# Patient Record
(demographics unavailable — no encounter records)

---

## 2018-01-01 NOTE — DS
- Maternal History


Mother's Age: 27yo


 Status: 


HBSAG: Unknown


RPR: Negative


Date: 18


Group B Strep: Unknown


GBS Treated in Labor: Yes


HIV: Negative





- Maternal Risks


OB Risks: e.t.o.p. 3/16; h/o kidney stone treated with antibiotics 2 yrs ago.  

patient drop in from [facility], no PN care; GBS unknown, 

treated with amp x3 (ROM 1H 55M).  Admitted to nursery at 2208





 Data





- Admission


Date of Admission: 18


Admission Time: 22:08


Date of Delivery: 18


Time of Delivery: 21:07


Wks Gestation by Dates: 38.4


Wks Gestation by Sono: 37.6


Infant Gender: Female


Type of Delivery: 


Apgar Score @1 Minute: 9


Apgar score @ 5 Minutes: 9


Birth Weight: 5 lb 7.797 oz


Birth Length: 19 in


Head Circumference, Admission: 32


Chest Circumference: 31


Abdominal Girth: 30





- Vital Signs


  ** Left Upper Arm


Blood Pressure: 64/36


Blood Pressure Mean: 45





  ** Right Upper Arm


Blood Pressure: 65/34


Blood Pressure Mean: 44





  ** Left Calf


Blood Pressure: 55/36


Blood Pressure Mean: 42





  ** Right Calf


Blood Pressure: 60/39


Blood Pressure Mean: 46





- Hearing Screen


Left Ear: Passed


Right Ear: Passed


Hearing Screen Complete: 18





- Labs


Labs: 


 Baby's Blood Type, Syed











Cord Blood Type  B POSITIVE   18  21:20    


 


SABINE, Poly Interpret  Negative  (NEGATIVE)   18  21:20    














- Hepatitis B Vaccine Given


Date: 





Medications








Hepatitis B Vaccine (Engerix-B 10 Mcg/0.5 Ml *Pediatric* -)  10 mcg IM .ONCE ONE


   Stop: 18 23:01











 PE, Discharge





- Physical Exam


Last Weight Documented: 5 lb 5.01 oz


Vital Signs: 


 Vital Signs











Temperature  98.4 F   18 21:00


 


Pulse Rate  142   18 22:08


 


Respiratory Rate  35   18 22:08


 


Blood Pressure  64/36   18 10:01


 


O2 Sat by Pulse Oximetry (%)      








 SpO2





Preductal SpO2, Right Arm        100


Postductal SpO2 [Left Leg]       99








General Appearance: Yes: Well flexed, Full ROM, Spontaneous movements, Pink


Skin: Yes: No Abnormalities


Head: Yes: Fontanel flat


Eyes: Yes: Clear


Ears: Yes: Symmetrical


Nose: Yes: Nares patent


Mouth: No: Cleft lip, Cleft palate


Chest: Yes: Symmetrical


Lungs/Respiratory: Yes: Bilateral good air entry.  No: Sternal retractions, 

Substernal retractions


Cardiac: Yes: S1, S2, Peripheral pulses strong, Capillary refill immediat.  No: 

Murmur


Abdomen: No: Mass palpable


Gastrointestinal: No: Hepatomegaly, Splenomegaly


Genitalia: No Abnormalities


Genitalia, Female: Yes: Labia Normal


Anus: Yes: Patent


Extremities: Yes: No Abnormalities, 10 Fingers, 10 Toes


Spine: No: Sacral dimple, Hair tuft


Reflexes: [place]: Present, Rooting: Present, Sucking: Present


Neuro: Yes: Alert, Active


Cry: Yes: Strong


Preductal SpO2, Right Arm: 100


  ** Left Leg


Postductal SpO2: 99





Problem List





- Problems


(1) Single liveborn infant, delivered vaginally


Assessment/Plan: 


AGA FEMALE BORN TO 27YO  MOTHER WHO WAS A DROP-IN TO [facility]. RPR AND HIV ARE 

NEGATIVE. GBS IS UNKNOWN  AND MOTHER WAS TREATED  X 3 ,ROM 1HR AND 55 MIN


P: ROUTINE CARE


 FEED AD ABHIJEET


DISCHARGE HOME


Code(s): Z38.00 - SINGLE LIVEBORN INFANT, DELIVERED VAGINALLY   





Discharge Summary


Reason For Visit: 


Current Active Problems





Single liveborn infant, delivered vaginally (Acute)








Condition: Good





- Instructions


Diet, Activity, Other Instructions: 


f/u    2018


@ : [facility] 2000


     [address] [place], [place]


Disposition: HOME

## 2018-01-01 NOTE — HP
- Maternal History


Mother's Age: 29yo


 Status: 


HBSAG: Unknown


RPR: Negative


Date: 18


Group B Strep: Unknown


GBS Treated in Labor: Yes


HIV: Negative





- Maternal Risks


OB Risks: e.t.o.p. 3/16; h/o kidney stone treated with antibiotics 2 yrs ago.  

patient drop in from [facility], no PN care; GBS unknown, 

treated with amp x3 (ROM 1H 55M).  Admitted to nursery at 2208





 Data





- Admission


Date of Admission: 18


Admission Time: 22:08


Date of Delivery: 18


Time of Delivery: 21:07


Wks Gestation by Dates: 38.4


Wks Gestation by Sono: 37.6


Infant Gender: Female


Type of Delivery: 


Apgar Score @1 Minute: 9


Apgar score @ 5 Minutes: 9


Birth Weight: 5 lb 7.797 oz


Birth Length: 19 in


Head Circumference, Admission: 32


Chest Circumference: 31


Abdominal Girth: 30





- Vital Signs


  ** Left Upper Arm


Blood Pressure: 64/36


Blood Pressure Mean: 45





  ** Right Upper Arm


Blood Pressure: 65/34


Blood Pressure Mean: 44





  ** Left Calf


Blood Pressure: 55/36


Blood Pressure Mean: 42





  ** Right Calf


Blood Pressure: 60/39


Blood Pressure Mean: 46





- Labs


Labs: 


 Baby's Blood Type, Syed











Cord Blood Type  B POSITIVE   18  21:20    


 


SABINE, Poly Interpret  Negative  (NEGATIVE)   18  21:20    














- Hepatitis B Vaccine Given


Date: 





Medications











Hepatitis B Vaccine (Engerix-B 10 Mcg/0.5 Ml *Pediatric* -)  10 mcg IM .ONCE ONE


   Stop: 18 23:01


   Last Admin: 18 23:40 Dose:  10 mcg











[place] Infant, Physical Exam





-  Infant, Admission Exam


Birth Weight: 5 lb 7.797 oz


Birth Length: 19 in


Chest Circumference: 31


Head Circumference, Admission: 32


Initial Vital Signs: 


 Initial Vital Signs











Temp Pulse Resp


 


 95.8 F L  142   35 


 


 18 22:08  18 22:08  18 22:08











General Appearance: Yes: Well flexed, Full ROM, Spontaneous movements, Pink


Skin: Yes: No Abnormalities


Head: Yes: Fontanel flat


Eyes: Yes: Clear


Ears: Yes: Symmetrical


Nose: Yes: Nares patent


Mouth: No: Cleft lip, Cleft palate


Chest: Yes: Symmetrical


Lungs/Respiratory: Yes: Bilateral good air entry.  No: Sternal retractions, 

Substernal retractions


Cardiac: Yes: S1, S2, Peripheral pulses strong, Capillary refill immediat.  No: 

Murmur


Abdomen: No: Mass palpable


Gastrointestinal: No: Hepatomegaly, Splenomegaly


Genitalia: No Abnormalities


Genitalia, Female: Yes: Labia Normal


Anus: Yes: Patent


Extremities: Yes: No Abnormalities, 10 Fingers, 10 Toes


Clavicles: No abnormalities


Femoral Pulse: Strong


Ortolani Test: Negative


Solitario Test: Negative


Spine: No: Sacral dimple, Hair tuft


Reflexes: [place]: Present, Rooting: Present, Sucking: Present


Neuro: Yes: Alert, Active


Cry: Yes: Strong





Problem List





- Problems


(1) Single liveborn infant, delivered vaginally


Assessment/Plan: 


AGA FEMALE BORN TO 29YO  MOTHER WHO WAS A DROP-IN TO [facility]. RPR AND HIV ARE 

NEGATIVE. GBS IS UNKNOWN  AND MOTHER WAS TREATED  X 3 ,ROM 1HR AND 55 MIN


P: ROUTINE CARE


 FEED AD ABHIJEET


CLOSE OBSERVATION


Code(s): Z38.00 - SINGLE LIVEBORN INFANT, DELIVERED VAGINALLY